# Patient Record
Sex: FEMALE | Race: WHITE | NOT HISPANIC OR LATINO | URBAN - METROPOLITAN AREA
[De-identification: names, ages, dates, MRNs, and addresses within clinical notes are randomized per-mention and may not be internally consistent; named-entity substitution may affect disease eponyms.]

---

## 2017-01-18 ENCOUNTER — OFFICE (OUTPATIENT)
Dept: URBAN - METROPOLITAN AREA CLINIC 11 | Facility: CLINIC | Age: 62
End: 2017-01-18
Payer: MEDICARE

## 2017-01-18 VITALS
SYSTOLIC BLOOD PRESSURE: 136 MMHG | HEART RATE: 65 BPM | HEIGHT: 62 IN | WEIGHT: 165 LBS | DIASTOLIC BLOOD PRESSURE: 78 MMHG | TEMPERATURE: 97 F

## 2017-01-18 DIAGNOSIS — K64.8 OTHER HEMORRHOIDS: ICD-10-CM

## 2017-01-18 DIAGNOSIS — E66.9 OBESITY, UNSPECIFIED: ICD-10-CM

## 2017-01-18 DIAGNOSIS — R11.10 VOMITING, UNSPECIFIED: ICD-10-CM

## 2017-01-18 DIAGNOSIS — R10.30 LOWER ABDOMINAL PAIN, UNSPECIFIED: ICD-10-CM

## 2017-01-18 DIAGNOSIS — K52.9 NONINFECTIVE GASTROENTERITIS AND COLITIS, UNSPECIFIED: ICD-10-CM

## 2017-01-18 DIAGNOSIS — R11.0 NAUSEA: ICD-10-CM

## 2017-01-18 DIAGNOSIS — R19.4 CHANGE IN BOWEL HABIT: ICD-10-CM

## 2017-01-18 DIAGNOSIS — K52.89 OTHER SPECIFIED NONINFECTIVE GASTROENTERITIS AND COLITIS: ICD-10-CM

## 2017-01-18 PROCEDURE — 99213 OFFICE O/P EST LOW 20 MIN: CPT | Performed by: INTERNAL MEDICINE

## 2017-01-18 PROCEDURE — G8427 DOCREV CUR MEDS BY ELIG CLIN: HCPCS | Performed by: INTERNAL MEDICINE

## 2017-01-18 NOTE — SERVICEHPINOTES
Ms. Quiñones is a 61-year-old female with a long history of chronic abdominal complaints who is now here with new onset acute GI complaints different than her normal symptoms. Please see prior notes for details of her past history. She has had an extensive negative workup. The patient states that she was in her normal state of health until last night when she started having acute onset lower abdominal cramping pain associated with diarrhea, nausea, and some vomiting. She states that she is not really having much diarrhea now, but is having red tinged mucus per rectum. This has occurred about seven times since last night. She feels fatigued and tired. She has had some chills but has not had any fever. She states that the symptoms are different and worse than her normal IBS type symptoms. She denies any recent travel. She denies any recent antibiotic use. She did spleen yesterday at the [employer] using a computer and also took care of her granddaughter the day before. She denies any sick contacts that she is aware of. She ate raw vegetables and cooked vegetables last night for dinner. Of note, the patient is on Bentyl, Lomotil, and colestipol for her chronic IBS with diarrhea.

## 2017-01-18 NOTE — SERVICENOTES
The patient has a history of chronic IBS with diarrhea but at this time as new onset of acute symptoms that appear different than her normal symptoms are more consistent with a gastroenteritis, favoring colitis given the tenesmus and red tinged mucus.  She is afebrile.  Vital signs are stable.  I will check some basic blood work.  Unless she has significant elevation of her white blood cell count we will hold off on antibiotics and treat conservatively now with fluids, bland diet, and Pepto-Bismol.  We will check stool studies, including stool for enteric hemorrhagic Escherichia coli and C. difficile.  If stool studies are negative she can restart taking Lomotil.  I also instructed her to restart taking her Bentyl which may help her abdominal cramping.  If her symptoms worsen or persist then we may consider empiric trial of an antibiotic.  She should also notify us if she has any larger volume bleeding.  We will hold off on any endoscopic evaluation at this time given the acute onset of symptoms and no significant bleeding.  If diarrhea is persistent for the next couple of weeks we may consider endoscopic evaluation.

## 2017-01-22 LAB
BASIC METABOLIC PANEL: CALCIUM TOTAL: 9.7 MG/DL (ref 8.5–10.5)
BASIC METABOLIC PANEL: CARBON DIOXIDE: 29 MEQ/L (ref 21–31)
BASIC METABOLIC PANEL: CHLORIDE: 100 MEQ/L (ref 96–106)
BASIC METABOLIC PANEL: CREATININE: 0.64 MG/DL — LOW (ref 0.8–1.4)
BASIC METABOLIC PANEL: GLUCOSE: 105 MG/DL — HIGH (ref 65–100)
BASIC METABOLIC PANEL: POTASSIUM: 4.2 MEQ/ML (ref 3.5–5.4)
BASIC METABOLIC PANEL: SODIUM: 142 MEQ/L (ref 135–145)
BASIC METABOLIC PANEL: UREA NITROGEN: 16 MG/DL (ref 6–20)
CBC COMPLETE BLOOD COUNT W/O DIFF: HEMATOCRIT: 39.6 % (ref 39–55)
CBC COMPLETE BLOOD COUNT W/O DIFF: HEMOGLOBIN: 13.2 G/DL (ref 13–17.5)
CBC COMPLETE BLOOD COUNT W/O DIFF: MCH: 23.7 PG — LOW (ref 25–35)
CBC COMPLETE BLOOD COUNT W/O DIFF: MCHC: 33.3 % (ref 30–38)
CBC COMPLETE BLOOD COUNT W/O DIFF: MCV: 71.1 FL — LOW (ref 78–102)
CBC COMPLETE BLOOD COUNT W/O DIFF: PLATELET COUNT: 300 K/UL (ref 150–450)
CBC COMPLETE BLOOD COUNT W/O DIFF: RBC DISTRIBUTION WIDTH: 16.3 % — HIGH (ref 11.5–16)
CBC COMPLETE BLOOD COUNT W/O DIFF: RED BLOOD CELL COUNT: 5.57 M/UL (ref 4.3–5.7)
CBC COMPLETE BLOOD COUNT W/O DIFF: WHITE BLOOD CELL COUNT: 8 K/UL (ref 4–11)
CLOSTRIDIUM DIFFICILE TOXIN: CD TOXIN: (no result)
STOOL CULTURE: C STOOL: (no result)

## 2017-09-08 ENCOUNTER — OFFICE (OUTPATIENT)
Dept: URBAN - METROPOLITAN AREA CLINIC 11 | Facility: CLINIC | Age: 62
End: 2017-09-08

## 2017-09-08 PROCEDURE — G9199 DOC REASON FOR NO VTE: HCPCS | Performed by: INTERNAL MEDICINE

## 2017-11-17 ENCOUNTER — OFFICE (OUTPATIENT)
Dept: URBAN - METROPOLITAN AREA CLINIC 11 | Facility: CLINIC | Age: 62
End: 2017-11-17
Payer: MEDICARE

## 2017-11-17 VITALS
HEART RATE: 64 BPM | WEIGHT: 154 LBS | SYSTOLIC BLOOD PRESSURE: 138 MMHG | DIASTOLIC BLOOD PRESSURE: 76 MMHG | HEIGHT: 62 IN

## 2017-11-17 DIAGNOSIS — R14.0 ABDOMINAL DISTENSION (GASEOUS): ICD-10-CM

## 2017-11-17 DIAGNOSIS — K21.9 GASTRO-ESOPHAGEAL REFLUX DISEASE WITHOUT ESOPHAGITIS: ICD-10-CM

## 2017-11-17 DIAGNOSIS — R10.84 GENERALIZED ABDOMINAL PAIN: ICD-10-CM

## 2017-11-17 DIAGNOSIS — R19.7 DIARRHEA, UNSPECIFIED: ICD-10-CM

## 2017-11-17 DIAGNOSIS — K58.9 IRRITABLE BOWEL SYNDROME WITHOUT DIARRHEA: ICD-10-CM

## 2017-11-17 DIAGNOSIS — D12.6 BENIGN NEOPLASM OF COLON, UNSPECIFIED: ICD-10-CM

## 2017-11-17 LAB
C-REACTIVE PROTEIN, QUANT: 11 MG/L — HIGH (ref 0–4.9)
C1 ESTERASE INHIBITOR, FUNC: C1 EST.INHIB.FUNCT.: 84 %MEAN NORMAL
CBC, PLATELET, NO DIFFERENTIAL: HEMATOCRIT: 42.5 % (ref 34–46.6)
CBC, PLATELET, NO DIFFERENTIAL: HEMOGLOBIN: 13.4 G/DL (ref 11.1–15.9)
CBC, PLATELET, NO DIFFERENTIAL: MCH: 23.1 PG — LOW (ref 26.6–33)
CBC, PLATELET, NO DIFFERENTIAL: MCHC: 31.5 G/DL (ref 31.5–35.7)
CBC, PLATELET, NO DIFFERENTIAL: MCV: 73 FL — LOW (ref 79–97)
CBC, PLATELET, NO DIFFERENTIAL: PLATELETS: 295 X10E3/UL (ref 150–379)
CBC, PLATELET, NO DIFFERENTIAL: RBC: 5.8 X10E6/UL — HIGH (ref 3.77–5.28)
CBC, PLATELET, NO DIFFERENTIAL: RDW: 16.7 % — HIGH (ref 12.3–15.4)
CBC, PLATELET, NO DIFFERENTIAL: WBC: 5.8 X10E3/UL (ref 3.4–10.8)
COMPLEMENT C4, SERUM: 39 MG/DL (ref 14–44)
FE+TIBC+FER: FERRITIN, SERUM: 262 NG/ML — HIGH (ref 15–150)
FE+TIBC+FER: IRON BIND.CAP.(TIBC): 294 UG/DL (ref 250–450)
FE+TIBC+FER: IRON SATURATION: 32 % (ref 15–55)
FE+TIBC+FER: IRON, SERUM: 94 UG/DL (ref 27–159)
FE+TIBC+FER: UIBC: 200 UG/DL (ref 131–425)
SEDIMENTATION RATE-WESTERGREN: 14 MM/HR (ref 0–40)
VITAMIN B12 AND FOLATE: FOLATE (FOLIC ACID), SERUM: >20 NG/ML
VITAMIN B12 AND FOLATE: VITAMIN B12: 1014 PG/ML — HIGH (ref 211–946)

## 2017-11-17 PROCEDURE — 99213 OFFICE O/P EST LOW 20 MIN: CPT | Performed by: INTERNAL MEDICINE

## 2017-11-17 PROCEDURE — G8427 DOCREV CUR MEDS BY ELIG CLIN: HCPCS | Performed by: INTERNAL MEDICINE

## 2017-11-17 RX ORDER — SODIUM PICOSULFATE, MAGNESIUM OXIDE, AND ANHYDROUS CITRIC ACID 10; 3.5; 12 MG/16.1G; G/16.1G; G/16.1G
POWDER, METERED ORAL
Qty: 1 | Refills: 0 | Status: COMPLETED
Start: 2017-11-17 | End: 2017-12-08

## 2017-11-17 RX ORDER — DICYCLOMINE HYDROCHLORIDE 10 MG/1
CAPSULE ORAL
Qty: 120 | Refills: 6 | Status: COMPLETED
Start: 2016-11-14 | End: 2018-10-16

## 2017-11-17 RX ORDER — DIPHENOXYLATE HYDROCHLORIDE AND ATROPINE SULFATE 2.5; .025 MG/1; MG/1
7.5 TABLET ORAL
Qty: 90 | Refills: 5 | Status: ACTIVE

## 2017-11-17 RX ORDER — NORTRIPTYLINE HYDROCHLORIDE 25 MG/1
CAPSULE ORAL
Qty: 60 | Refills: 6 | Status: COMPLETED
Start: 2017-11-17 | End: 2018-03-16

## 2017-11-17 NOTE — SERVICENOTES
Patient continues to have symptoms of diarrhea and abdominal pain.  Overall it does appear to be slightly better, but has been persistent over the past 2 and half years.  I will go ahead and recheck some basic labs as above and give her a trial of a TCA to see if this might help.  In the meantime I did recommend she restart the Lomotil and dicyclomine.  I am a little concerned that she has lost 10 lb over the past year but overall she appears to be doing well. I will also check a few other labs to ensure no other type neuroendocrine issue going on as well. Given that her initial colonoscopy actually occurred prior to her diarrhea symptoms started we discussed the possibility of proceeding with a repeat colonoscopy today in order to allow for repeat evaluation of the terminal ileum and random colon biopsies and the patient states that she would be interested in this. We can also consider capsule endoscopy down the road, but the patient would like to hold off on this for now.

## 2017-11-17 NOTE — SERVICEHPINOTES
Ms. Quiñones is a 62-year-old female here for follow-up of abdominal pain, diarrhea, and heartburn. I initially saw her in March 2015 were at that time she was having more epigastric abdominal pain and burning. This is mainly after meals. It was associated with some nausea and bloating. At that time she was not having any diarrhea. Blood work at that time did include a mild microcytic anemia with mild decrease of hemoglobin to 12.5 but normal hematocrit of 40.2. C-reactive protein was minimally elevated at 0.7. She underwent EGD and colonoscopy with EGD revealing normal esophagus, gastric, and duodenal mucosa was negative biopsies from the stomach and duodenum. Colonoscopy at the same time revealed normal mucosa to the level of the terminal ileum. Two small polyps removed, one of which was a tubular adenoma. Her next colonoscopy is due in five years. Iron studies performed were negative including normal ferritin and percent iron saturation. Because of some persistent symptoms she underwent CT of the abdomen and pelvis that was completely normal. Because of persistent symptoms we gave her a trial of rifaximin, which she states did not help much at all. She was also on probiotics for two months which also did not help much. We switched her from Levsin to Bentyl which she states helps more, but she still has symptoms. Because of some persistent diarrhea we gave her a trial of colestipol which she states did not help that much. Because of this she stopped it. She now states that she is having a bowel movement every other day and will have diarrhea episodes twice a week. Her symptoms are better when she takes Lomotil and an antispasmodic. She denies any fevers or chills. She has noted some difficulty with swallowing recently and was recently diagnosed with a goiter. She also has lost around 10 lb since January 2017. BR

## 2017-12-08 ENCOUNTER — AMBULATORY SURGICAL CENTER (OUTPATIENT)
Dept: URBAN - METROPOLITAN AREA SURGERY 3 | Facility: SURGERY | Age: 62
End: 2017-12-08

## 2017-12-08 ENCOUNTER — OFFICE (OUTPATIENT)
Dept: URBAN - METROPOLITAN AREA PATHOLOGY 22 | Facility: PATHOLOGY | Age: 62
End: 2017-12-08

## 2017-12-08 ENCOUNTER — AMBULATORY SURGICAL CENTER (OUTPATIENT)
Dept: URBAN - METROPOLITAN AREA SURGERY 3 | Facility: SURGERY | Age: 62
End: 2017-12-08
Payer: COMMERCIAL

## 2017-12-08 VITALS
HEIGHT: 62 IN | HEART RATE: 70 BPM | DIASTOLIC BLOOD PRESSURE: 73 MMHG | DIASTOLIC BLOOD PRESSURE: 50 MMHG | HEIGHT: 62 IN | SYSTOLIC BLOOD PRESSURE: 146 MMHG | HEART RATE: 72 BPM | SYSTOLIC BLOOD PRESSURE: 123 MMHG | SYSTOLIC BLOOD PRESSURE: 133 MMHG | HEART RATE: 66 BPM | RESPIRATION RATE: 18 BRPM | DIASTOLIC BLOOD PRESSURE: 57 MMHG | SYSTOLIC BLOOD PRESSURE: 132 MMHG | RESPIRATION RATE: 20 BRPM | HEART RATE: 76 BPM | DIASTOLIC BLOOD PRESSURE: 80 MMHG | RESPIRATION RATE: 16 BRPM | RESPIRATION RATE: 20 BRPM | HEART RATE: 70 BPM | OXYGEN SATURATION: 98 % | SYSTOLIC BLOOD PRESSURE: 123 MMHG | TEMPERATURE: 97.1 F | SYSTOLIC BLOOD PRESSURE: 136 MMHG | OXYGEN SATURATION: 95 % | WEIGHT: 154 LBS | DIASTOLIC BLOOD PRESSURE: 90 MMHG | DIASTOLIC BLOOD PRESSURE: 73 MMHG | OXYGEN SATURATION: 98 % | OXYGEN SATURATION: 99 % | SYSTOLIC BLOOD PRESSURE: 133 MMHG | RESPIRATION RATE: 16 BRPM | HEART RATE: 66 BPM | OXYGEN SATURATION: 96 % | SYSTOLIC BLOOD PRESSURE: 132 MMHG | WEIGHT: 154 LBS | DIASTOLIC BLOOD PRESSURE: 100 MMHG | DIASTOLIC BLOOD PRESSURE: 100 MMHG | DIASTOLIC BLOOD PRESSURE: 57 MMHG | OXYGEN SATURATION: 99 % | SYSTOLIC BLOOD PRESSURE: 146 MMHG | SYSTOLIC BLOOD PRESSURE: 122 MMHG | OXYGEN SATURATION: 96 % | SYSTOLIC BLOOD PRESSURE: 122 MMHG | OXYGEN SATURATION: 95 % | DIASTOLIC BLOOD PRESSURE: 90 MMHG | RESPIRATION RATE: 17 BRPM | TEMPERATURE: 97.1 F | SYSTOLIC BLOOD PRESSURE: 136 MMHG | DIASTOLIC BLOOD PRESSURE: 50 MMHG | OXYGEN SATURATION: 100 % | TEMPERATURE: 97.8 F | DIASTOLIC BLOOD PRESSURE: 80 MMHG | OXYGEN SATURATION: 100 % | HEART RATE: 72 BPM | TEMPERATURE: 97.8 F | RESPIRATION RATE: 18 BRPM | HEART RATE: 76 BPM | RESPIRATION RATE: 17 BRPM

## 2017-12-08 DIAGNOSIS — R19.7 DIARRHEA, UNSPECIFIED: ICD-10-CM

## 2017-12-08 DIAGNOSIS — K64.0 FIRST DEGREE HEMORRHOIDS: ICD-10-CM

## 2017-12-08 PROBLEM — K52.89 CLINICALLY SIGNIFICANT DIARRHEA OF UNEXPLAINED ORIGIN: Status: ACTIVE | Noted: 2017-12-08

## 2017-12-08 PROCEDURE — G8907 PT DOC NO EVENTS ON DISCHARG: HCPCS | Performed by: INTERNAL MEDICINE

## 2017-12-08 PROCEDURE — G8918 PT W/O PREOP ORDER IV AB PRO: HCPCS | Performed by: INTERNAL MEDICINE

## 2017-12-08 PROCEDURE — 88342 IMHCHEM/IMCYTCHM 1ST ANTB: CPT | Performed by: INTERNAL MEDICINE

## 2017-12-08 PROCEDURE — 45380 COLONOSCOPY AND BIOPSY: CPT | Performed by: INTERNAL MEDICINE

## 2017-12-08 PROCEDURE — 88305 TISSUE EXAM BY PATHOLOGIST: CPT | Performed by: INTERNAL MEDICINE

## 2018-03-16 ENCOUNTER — OFFICE (OUTPATIENT)
Dept: URBAN - METROPOLITAN AREA CLINIC 11 | Facility: CLINIC | Age: 63
End: 2018-03-16

## 2018-03-16 VITALS
DIASTOLIC BLOOD PRESSURE: 76 MMHG | HEIGHT: 62 IN | SYSTOLIC BLOOD PRESSURE: 129 MMHG | HEART RATE: 64 BPM | WEIGHT: 166 LBS

## 2018-03-16 DIAGNOSIS — K21.9 GASTRO-ESOPHAGEAL REFLUX DISEASE WITHOUT ESOPHAGITIS: ICD-10-CM

## 2018-03-16 DIAGNOSIS — R19.7 DIARRHEA, UNSPECIFIED: ICD-10-CM

## 2018-03-16 DIAGNOSIS — R19.4 CHANGE IN BOWEL HABIT: ICD-10-CM

## 2018-03-16 DIAGNOSIS — R14.0 ABDOMINAL DISTENSION (GASEOUS): ICD-10-CM

## 2018-03-16 DIAGNOSIS — R13.10 DYSPHAGIA, UNSPECIFIED: ICD-10-CM

## 2018-03-16 DIAGNOSIS — R10.84 GENERALIZED ABDOMINAL PAIN: ICD-10-CM

## 2018-03-16 DIAGNOSIS — K58.0 IRRITABLE BOWEL SYNDROME WITH DIARRHEA: ICD-10-CM

## 2018-03-16 DIAGNOSIS — Z86.010 PERSONAL HISTORY OF COLONIC POLYPS: ICD-10-CM

## 2018-03-16 DIAGNOSIS — E66.9 OBESITY, UNSPECIFIED: ICD-10-CM

## 2018-03-16 DIAGNOSIS — E04.9 NONTOXIC GOITER, UNSPECIFIED: ICD-10-CM

## 2018-03-16 PROCEDURE — 99213 OFFICE O/P EST LOW 20 MIN: CPT | Performed by: INTERNAL MEDICINE

## 2018-03-16 RX ORDER — CETIRIZINE HYDROCHLORIDE 10 MG/1
TABLET ORAL
Qty: 30 | Refills: 5 | Status: COMPLETED
Start: 2018-03-16 | End: 2018-10-16

## 2018-03-16 RX ORDER — DICYCLOMINE HYDROCHLORIDE 10 MG/1
CAPSULE ORAL
Qty: 120 | Refills: 6 | Status: COMPLETED
Start: 2016-11-14 | End: 2018-10-16

## 2018-03-16 RX ORDER — RANITIDINE 150 MG/1
300 TABLET ORAL
Qty: 60 | Refills: 11 | Status: COMPLETED
Start: 2018-03-16 | End: 2018-10-16

## 2018-03-16 RX ORDER — DIPHENOXYLATE HYDROCHLORIDE AND ATROPINE SULFATE 2.5; .025 MG/1; MG/1
7.5 TABLET ORAL
Qty: 90 | Refills: 5 | Status: ACTIVE

## 2018-03-16 NOTE — SERVICEHPINOTES
Ms. Quiñones is a 62-year-old female here for follow-up of abdominal pain, diarrhea, and heartburn. I initially saw her in March 2015 were at that time she was having more epigastric abdominal pain and burning. This is mainly after meals. It was associated with some nausea and bloating. At that time she was not having any diarrhea. Blood work at that time did include a mild microcytic anemia with mild decrease of hemoglobin to 12.5 but normal hematocrit of 40.2. C-reactive protein was minimally elevated at 0.7. She underwent EGD and colonoscopy with EGD revealing normal esophagus, gastric, and duodenal mucosa was negative biopsies from the stomach and duodenum. Colonoscopy at the same time revealed normal mucosa to the level of the terminal ileum. Two small polyps removed, one of which was a tubular adenoma. Her next colonoscopy is due in five years. Iron studies performed were negative including normal ferritin and percent iron saturation. Because of some persistent symptoms she underwent CT of the abdomen and pelvis that was completely normal. Because of persistent symptoms we gave her a trial of rifaximin, which she states did not help much at all. She was also on probiotics for two months which also did not help much. We switched her from Levsin to Bentyl which she states helps more, but she still has symptoms. Because of some persistent diarrhea we gave her a trial of colestipol which she states did not help that much. Because of this she stopped it. When I last saw the patient November 2017 she was having some more diarrhea and had some weight loss. We check blood work which was normal including normal complement levels, normal hematocrit, and normal inflammatory markers except for mild elevation of CRP at 11. We did have her undergo colonoscopy which was normal to the terminal ileum. Biopsies were normal except for some variable increase in mast cells. Because of this we did give her a trial of H1 and H2 blocker which she states actuallyhelps some but she stopped taking because she did not want to take the medicine on a regular basis. She states overall she is doing better. She still have a flare of diarrhea occurring a couple of times every month. She states this usually occurs whenever she goes out to eat and has some dietary indiscretion. She does take Lomotil as needed. She also takes Bentyl as needed. She states that she has had some more issues with swallowing stating that she feels food getting stuck on the way down. She has had occasionally regurgitate food. She thinks that it is mainly due to solids and meats but does not any problems with liquids. She states that she has a goiter that is probably going to have surgery sometime in the next few months and thinks that it may be related to this. She has gained about 10 lb since our last visit.

## 2018-03-16 NOTE — SERVICENOTES
Overall the patient actually does appear to be doing better.  Her weight has improved.  Her symptoms do appear most consistent with IBS with diarrhea.  Because she is doing better we will hold off on capsule endoscopy, which is fine with the patient. I did recommend she start taking Zantac and cetirizine on a more regular basis, however she states that she will only take it more as needed because she does not want to take something on a regular basis.  I did recommend that she start taking Lomotil and Bentyl prior to eating out to see this may help with her urgency and diarrhea.  She was told to notify us if symptoms do worsen in the meantime.  Because of her dysphagia symptoms I do think she should undergo EGD.  I did offer esophagram the patient would like to undergo EGD and is okay with empiric dilation.  While this could be due to her goiter, I would like to rule out another esophageal source such as eosinophilic esophagitis or stricture.

## 2018-04-23 ENCOUNTER — AMBULATORY SURGICAL CENTER (OUTPATIENT)
Dept: URBAN - METROPOLITAN AREA SURGERY 3 | Facility: SURGERY | Age: 63
End: 2018-04-23
Payer: COMMERCIAL

## 2018-04-23 ENCOUNTER — AMBULATORY SURGICAL CENTER (OUTPATIENT)
Dept: URBAN - METROPOLITAN AREA SURGERY 3 | Facility: SURGERY | Age: 63
End: 2018-04-23

## 2018-04-23 ENCOUNTER — OFFICE (OUTPATIENT)
Dept: URBAN - METROPOLITAN AREA PATHOLOGY 22 | Facility: PATHOLOGY | Age: 63
End: 2018-04-23

## 2018-04-23 VITALS
TEMPERATURE: 97.6 F | HEART RATE: 70 BPM | RESPIRATION RATE: 18 BRPM | TEMPERATURE: 98.1 F | OXYGEN SATURATION: 95 % | DIASTOLIC BLOOD PRESSURE: 73 MMHG | HEART RATE: 70 BPM | SYSTOLIC BLOOD PRESSURE: 135 MMHG | DIASTOLIC BLOOD PRESSURE: 69 MMHG | SYSTOLIC BLOOD PRESSURE: 127 MMHG | WEIGHT: 160 LBS | DIASTOLIC BLOOD PRESSURE: 73 MMHG | RESPIRATION RATE: 19 BRPM | SYSTOLIC BLOOD PRESSURE: 129 MMHG | WEIGHT: 160 LBS | DIASTOLIC BLOOD PRESSURE: 80 MMHG | RESPIRATION RATE: 18 BRPM | DIASTOLIC BLOOD PRESSURE: 70 MMHG | RESPIRATION RATE: 16 BRPM | DIASTOLIC BLOOD PRESSURE: 75 MMHG | HEART RATE: 74 BPM | SYSTOLIC BLOOD PRESSURE: 136 MMHG | DIASTOLIC BLOOD PRESSURE: 76 MMHG | SYSTOLIC BLOOD PRESSURE: 127 MMHG | RESPIRATION RATE: 19 BRPM | DIASTOLIC BLOOD PRESSURE: 76 MMHG | SYSTOLIC BLOOD PRESSURE: 126 MMHG | RESPIRATION RATE: 16 BRPM | HEART RATE: 73 BPM | TEMPERATURE: 97.8 F | HEART RATE: 89 BPM | DIASTOLIC BLOOD PRESSURE: 69 MMHG | TEMPERATURE: 97.8 F | DIASTOLIC BLOOD PRESSURE: 80 MMHG | OXYGEN SATURATION: 95 % | DIASTOLIC BLOOD PRESSURE: 70 MMHG | OXYGEN SATURATION: 94 % | HEART RATE: 73 BPM | HEART RATE: 89 BPM | OXYGEN SATURATION: 94 % | OXYGEN SATURATION: 96 % | TEMPERATURE: 98.1 F | TEMPERATURE: 97.6 F | SYSTOLIC BLOOD PRESSURE: 126 MMHG | HEIGHT: 62 IN | SYSTOLIC BLOOD PRESSURE: 135 MMHG | HEART RATE: 78 BPM | SYSTOLIC BLOOD PRESSURE: 136 MMHG | OXYGEN SATURATION: 96 % | SYSTOLIC BLOOD PRESSURE: 129 MMHG | HEART RATE: 78 BPM | HEIGHT: 62 IN | HEART RATE: 74 BPM | DIASTOLIC BLOOD PRESSURE: 75 MMHG

## 2018-04-23 DIAGNOSIS — K21.9 GASTRO-ESOPHAGEAL REFLUX DISEASE WITHOUT ESOPHAGITIS: ICD-10-CM

## 2018-04-23 DIAGNOSIS — K29.50 UNSPECIFIED CHRONIC GASTRITIS WITHOUT BLEEDING: ICD-10-CM

## 2018-04-23 DIAGNOSIS — R13.10 DYSPHAGIA, UNSPECIFIED: ICD-10-CM

## 2018-04-23 DIAGNOSIS — R14.0 ABDOMINAL DISTENSION (GASEOUS): ICD-10-CM

## 2018-04-23 PROCEDURE — 88313 SPECIAL STAINS GROUP 2: CPT | Performed by: INTERNAL MEDICINE

## 2018-04-23 PROCEDURE — 88342 IMHCHEM/IMCYTCHM 1ST ANTB: CPT | Performed by: INTERNAL MEDICINE

## 2018-04-23 PROCEDURE — 88305 TISSUE EXAM BY PATHOLOGIST: CPT | Performed by: INTERNAL MEDICINE

## 2018-04-23 PROCEDURE — G8918 PT W/O PREOP ORDER IV AB PRO: HCPCS | Performed by: INTERNAL MEDICINE

## 2018-04-23 PROCEDURE — 43239 EGD BIOPSY SINGLE/MULTIPLE: CPT | Mod: 59 | Performed by: INTERNAL MEDICINE

## 2018-04-23 PROCEDURE — 43248 EGD GUIDE WIRE INSERTION: CPT | Performed by: INTERNAL MEDICINE

## 2018-04-23 PROCEDURE — G8907 PT DOC NO EVENTS ON DISCHARG: HCPCS | Performed by: INTERNAL MEDICINE

## 2018-10-16 ENCOUNTER — OFFICE (OUTPATIENT)
Dept: URBAN - METROPOLITAN AREA CLINIC 12 | Facility: CLINIC | Age: 63
End: 2018-10-16

## 2018-10-16 VITALS
SYSTOLIC BLOOD PRESSURE: 140 MMHG | HEIGHT: 62 IN | DIASTOLIC BLOOD PRESSURE: 79 MMHG | HEART RATE: 69 BPM | WEIGHT: 169 LBS

## 2018-10-16 DIAGNOSIS — K21.9 GASTRO-ESOPHAGEAL REFLUX DISEASE WITHOUT ESOPHAGITIS: ICD-10-CM

## 2018-10-16 DIAGNOSIS — E66.9 OBESITY, UNSPECIFIED: ICD-10-CM

## 2018-10-16 DIAGNOSIS — R14.0 ABDOMINAL DISTENSION (GASEOUS): ICD-10-CM

## 2018-10-16 DIAGNOSIS — Z86.010 PERSONAL HISTORY OF COLONIC POLYPS: ICD-10-CM

## 2018-10-16 DIAGNOSIS — E04.9 NONTOXIC GOITER, UNSPECIFIED: ICD-10-CM

## 2018-10-16 DIAGNOSIS — R13.10 DYSPHAGIA, UNSPECIFIED: ICD-10-CM

## 2018-10-16 DIAGNOSIS — K58.0 IRRITABLE BOWEL SYNDROME WITH DIARRHEA: ICD-10-CM

## 2018-10-16 PROCEDURE — 99213 OFFICE O/P EST LOW 20 MIN: CPT | Performed by: INTERNAL MEDICINE

## 2018-10-16 RX ORDER — DICYCLOMINE HYDROCHLORIDE 20 MG/1
TABLET ORAL
Qty: 90 | Refills: 3 | Status: ACTIVE
Start: 2018-10-16

## 2018-10-16 RX ORDER — GLYCOPYRROLATE 1 MG/1
2 TABLET ORAL
Qty: 60 | Refills: 3 | Status: COMPLETED
Start: 2018-10-04 | End: 2019-12-11

## 2018-10-16 RX ORDER — DIPHENOXYLATE HYDROCHLORIDE AND ATROPINE SULFATE 2.5; .025 MG/1; MG/1
7.5 TABLET ORAL
Qty: 90 | Refills: 5 | Status: ACTIVE

## 2018-10-16 NOTE — SERVICENOTES
The patient's swallowing does appear to be doing better after goiter removal.  If she continues to have swallowing problems despite further healing from her surgery then we can consider esophagram.  I will try to get dicyclomine from a different pharmacy for her as this does work for her.  We will also renew her Lomotil.  If the dicyclomine is not available then we will try again to get her Robinul.  She should otherwise notify us if she has any worsening of symptoms in the meantime.

## 2018-10-16 NOTE — SERVICEHPINOTES
Ms. Quiñones is a 62-year-old female here for follow-up of IBS and dysphagia. I initially saw her in March 2015 were at that time she was having more epigastric abdominal pain and burning. This is mainly after meals. It was associated with some nausea and bloating. At that time she was not having any diarrhea. Blood work at that time did include a mild microcytic anemia with mild decrease of hemoglobin to 12.5 but normal hematocrit of 40.2. C-reactive protein was minimally elevated at 0.7. She underwent EGD and colonoscopy with EGD revealing normal esophagus, gastric, and duodenal mucosa was negative biopsies from the stomach and duodenum. Colonoscopy at the same time revealed normal mucosa to the level of the terminal ileum. Two small polyps removed, one of which was a tubular adenoma. Her next colonoscopy is due in five years. Iron studies performed were negative including normal ferritin and percent iron saturation. Because of some persistent symptoms she underwent CT of the abdomen and pelvis that was completely normal. Because of persistent symptoms we gave her a trial of rifaximin, which she states did not help much at all. She was also on probiotics for two months which also did not help much. We switched her from Levsin to Bentyl which she states helps more, but she still has symptoms. Because of some persistent diarrhea we gave her a trial of colestipol which she states did not help that much. Because of this she stopped it. When I saw the patient November 2017 she was having some more diarrhea and had some weight loss. We check blood work which was normal including normal complement levels, normal hematocrit, and normal inflammatory markers except for mild elevation of CRP at 11. We did have her undergo colonoscopy which was normal to the terminal ileum. Biopsies were normal except for some variable increase in mast cells. Because of this we did give her a trial of H1 and H2 blocker which she states actually helps some but she stopped taking because she did not want to take the medicine on a regular basis. Because of the patient's weight loss and dysphagia symptoms she underwent EGD and colonoscopy earlier this year. Colonoscopy because of some worse issues with dysphagia she did undergo EGD which was normal. Empiric dilation was performed. Esophageal biopsies, stomach biopsies, and duodenal biopsies were all negative. There is some mild patchy intestinal steatosis which appeared insignificant. She did have some mild pain after the EGD but this resolved over the next couple of days. She has since undergone surgery for goiter which she states she was told was wrapped around her esophagus. This occurred three weeks ago and she states that she still does feel a globus sensation but thinks it is related to inflammation and swelling from around the surgery. She states overall she is doing better. She has gained a few lb since her last visit. She states that as long as she takes her Lomotil and Bentyl her symptoms are under good control. She also has to watch her diet. She was told by her pharmacy that Bentyl was on back order so we sent in a prescription for Robinul, however it appears that she was unable to pick this up as the pharmacy apparently tried to give her a different medication that she refused.

## 2019-12-11 ENCOUNTER — OFFICE (OUTPATIENT)
Dept: URBAN - METROPOLITAN AREA CLINIC 19 | Facility: CLINIC | Age: 64
End: 2019-12-11

## 2019-12-11 VITALS
WEIGHT: 157 LBS | HEIGHT: 62 IN | DIASTOLIC BLOOD PRESSURE: 67 MMHG | SYSTOLIC BLOOD PRESSURE: 129 MMHG | HEART RATE: 76 BPM

## 2019-12-11 DIAGNOSIS — E04.9 NONTOXIC GOITER, UNSPECIFIED: ICD-10-CM

## 2019-12-11 DIAGNOSIS — K58.9 IRRITABLE BOWEL SYNDROME WITHOUT DIARRHEA: ICD-10-CM

## 2019-12-11 DIAGNOSIS — K21.9 GASTRO-ESOPHAGEAL REFLUX DISEASE WITHOUT ESOPHAGITIS: ICD-10-CM

## 2019-12-11 DIAGNOSIS — R13.10 DYSPHAGIA, UNSPECIFIED: ICD-10-CM

## 2019-12-11 PROCEDURE — 99213 OFFICE O/P EST LOW 20 MIN: CPT | Performed by: INTERNAL MEDICINE

## 2019-12-11 RX ORDER — DICYCLOMINE HYDROCHLORIDE 20 MG/1
TABLET ORAL
Qty: 120 | Refills: 11 | Status: COMPLETED
Start: 2019-12-11 | End: 2023-06-29

## 2019-12-11 RX ORDER — DIPHENOXYLATE HYDROCHLORIDE AND ATROPINE SULFATE 2.5; .025 MG/1; MG/1
7.5 TABLET ORAL
Qty: 90 | Refills: 5 | Status: ACTIVE

## 2019-12-11 NOTE — SERVICENOTES
Dr. Rich has interviewed and examined the patient and agrees with the plan of care.
The patient has had a significant workup as above which has been negative.  Her symptoms overall much improved from prior and she now only has a loose bowel movement occasionally depending on what she eats.  Unfortunate, she still does have dietary discretion.  I did recommend she consider taking the dicyclomine on a more regular basis, especially if she is going to be something that is questionable.  She does get a trial of Xifaxan 2015 which she states did not help that much.  She has not been taking a probiotic so I did recommend she start taking this again.  If her symptoms do not worsen and she starts having more frequent episodes of diarrhea then I do recommend checking stool for fecal elastase.  Her dysphagia has resolved since her goiter was removed.

## 2019-12-11 NOTE — SERVICEHPINOTES
Ms. Quiñones is a 64-year-old female here for follow-up of IBS and dysphagia.  initially saw her in March 2015 where at that time she was having more epigastric abdominal pain and burning. This is mainly after meals. It was associated with some nausea and bloating. At that time she was not having any diarrhea. Blood work at that time did include a mild microcytic anemia with mild decrease of hemoglobin to 12.5 but normal hematocrit of 40.2. C-reactive protein was minimally elevated at 0.7. She underwent EGD and colonoscopy with EGD revealing normal esophagus, gastric, and duodenal mucosa was negative biopsies from the stomach and duodenum. Colonoscopy at the same time revealed normal mucosa to the level of the terminal ileum. Two small polyps removed, one of which was a tubular adenoma. Her next colonoscopy is due in five years. Iron studies performed were negative including normal ferritin and percent iron saturation. Because of some persistent symptoms she underwent CT of the abdomen and pelvis that was completely normal. Because of persistent symptoms we gave her a trial of rifaximin, which she states did not help much at all. She was also on probiotics for two months which also did not help much. We switched her from Levsin to Bentyl which she states helps more, but she still has symptoms. Because of some persistent diarrhea we gave her a trial of colestipol which she states did not help that much. Because of this she stopped it. When Dr. Rich saw the patient November 2017 she was having some more diarrhea and had some weight loss. We check blood work which was normal including normal complement levels, normal hematocrit, and normal inflammatory markers except for mild elevation of CRP at 11. We did have her undergo colonoscopy which was normal to the terminal ileum. Biopsies were normal except for some variable increase in mast cells. Because of this we did give her a trial of H1 and H2 blocker which she states actually helps some but she stopped taking because she did not want to take the medicine on a regular basis. Because of the patient's weight loss and dysphagia symptoms she underwent EGD and colonoscopy earlier this year. Colonoscopy because of some worse issues with dysphagia she did undergo EGD which was normal. Empiric dilation was performed. Esophageal biopsies, stomach biopsies, and duodenal biopsies were all negative. There is some mild patchy intestinal steatosis which appeared insignificant. She did have some mild pain after the EGD but this resolved over the next couple of days. She has since undergone surgery for goiter which she states she was told was wrapped around her esophagus. This occurred three weeks ago and she states that she still does feel a globus sensation but thinks it is related to inflammation and swelling from around the surgery. She states overall she is doing better. She has gained a few lb since her last visit. She states that as long as she takes her Lomotil and Bentyl her symptoms are under good control. She also has to watch her diet. She was told by her pharmacy that Bentyl was on back order so we sent in a prescription for Robinul, however it appears that she was unable to pick this up as the pharmacy apparently tried to give her a different medication that she refused.BRInterim History: Overall, Ms. Quiñones is doing well. She states that she has no dysphagia whatsoever after her goiter was removed and she healed completely from her surgery. The patient denies reflux and states if she ever does have any indigestion she will just take a Tums or Pepto-Bismol tablet which alleviates her symptoms. The patient also reports that her IBS is under control. She states that it is mainly attributed to her diet and/or stress level. The patient states that she normally has a regular formed bowel movement every 1-2 days and that is her baseline. The patient reports when she does have a diarrhea episode it is usually around once every 6 weeks and will last 4-5 days. When she has these episodes the patient will take her Bentyl and Lomotil and eventually the symptoms will subside. The patient said this is not affecting her quality of life and it is tolerable. The patient denies melena, hematochezia, nausea, vomiting, or unintentional weight loss. The patient was recently started on phentermine for weight loss as she has gained some weight since her thyroid surgery. The patient has lost 12 lb since her last visit intentionally. The patient's next colonoscopy recall it is due in 2027.

## 2021-02-08 ENCOUNTER — OFFICE (OUTPATIENT)
Dept: URBAN - METROPOLITAN AREA CLINIC 11 | Facility: CLINIC | Age: 66
End: 2021-02-08

## 2021-02-08 VITALS
WEIGHT: 159 LBS | HEIGHT: 62 IN | OXYGEN SATURATION: 97 % | SYSTOLIC BLOOD PRESSURE: 120 MMHG | HEART RATE: 75 BPM | DIASTOLIC BLOOD PRESSURE: 66 MMHG

## 2021-02-08 DIAGNOSIS — K21.9 GASTRO-ESOPHAGEAL REFLUX DISEASE WITHOUT ESOPHAGITIS: ICD-10-CM

## 2021-02-08 DIAGNOSIS — R13.10 DYSPHAGIA, UNSPECIFIED: ICD-10-CM

## 2021-02-08 DIAGNOSIS — K58.0 IRRITABLE BOWEL SYNDROME WITH DIARRHEA: ICD-10-CM

## 2021-02-08 PROCEDURE — 99213 OFFICE O/P EST LOW 20 MIN: CPT | Performed by: INTERNAL MEDICINE

## 2021-02-08 RX ORDER — DICYCLOMINE HYDROCHLORIDE 20 MG/1
TABLET ORAL
Qty: 120 | Refills: 11 | Status: COMPLETED
Start: 2019-12-11 | End: 2023-06-29

## 2021-02-08 RX ORDER — DIPHENOXYLATE HYDROCHLORIDE AND ATROPINE SULFATE 2.5; .025 MG/1; MG/1
7.5 TABLET ORAL
Qty: 90 | Refills: 5 | Status: ACTIVE

## 2022-05-16 ENCOUNTER — OFFICE (OUTPATIENT)
Dept: URBAN - METROPOLITAN AREA CLINIC 11 | Facility: CLINIC | Age: 67
End: 2022-05-16

## 2022-05-16 VITALS
HEIGHT: 62 IN | HEART RATE: 63 BPM | SYSTOLIC BLOOD PRESSURE: 116 MMHG | DIASTOLIC BLOOD PRESSURE: 56 MMHG | OXYGEN SATURATION: 98 %

## 2022-05-16 DIAGNOSIS — R13.10 DYSPHAGIA, UNSPECIFIED: ICD-10-CM

## 2022-05-16 DIAGNOSIS — K21.9 GASTRO-ESOPHAGEAL REFLUX DISEASE WITHOUT ESOPHAGITIS: ICD-10-CM

## 2022-05-16 DIAGNOSIS — K58.0 IRRITABLE BOWEL SYNDROME WITH DIARRHEA: ICD-10-CM

## 2022-05-16 PROCEDURE — 99213 OFFICE O/P EST LOW 20 MIN: CPT | Performed by: INTERNAL MEDICINE

## 2022-05-16 RX ORDER — DIPHENOXYLATE HYDROCHLORIDE AND ATROPINE SULFATE 2.5; .025 MG/1; MG/1
TABLET ORAL
Qty: 90 | Refills: 5 | Status: COMPLETED
End: 2024-03-07

## 2022-05-16 RX ORDER — DICYCLOMINE HYDROCHLORIDE 20 MG/1
TABLET ORAL
Qty: 120 | Refills: 11 | Status: COMPLETED
Start: 2019-12-11 | End: 2023-06-29

## 2022-05-16 NOTE — SERVICEHPINOTES
Ms. Quiñones is a 65-year-old female here for follow-up of IBS and dysphagia. She continues to do well. She still does have some occasional issues with diarrhea for which she takes Bentyl and Lomotil as needed. She states she will take this on a more regular basis when she is on vacation because she has she tends to eat out more. In the past we have discussed TCA but she states that she does not want to take anything on a regular basis and continues to not want this. She denies any issues with fevers, chills, nausea, vomiting, or rectal bleeding. She denies any issues with dysphagia and states her reflux is under good control. It only occurs occasionally which she takes Tums or Pepto-Bismol which helps her symptoms.Previous GI History:Magdiel initially saw her in March 2015 where at that time she was having more epigastric abdominal pain and burning. This is mainly after meals. It was associated with some nausea and bloating. At that time she was not having any diarrhea. Blood work at that time did include a mild microcytic anemia with mild decrease of hemoglobin to 12.5 but normal hematocrit of 40.2. C-reactive protein was minimally elevated at 0.7. She underwent EGD and colonoscopy with EGD revealing normal esophagus, gastric, and duodenal mucosa was negative biopsies from the stomach and duodenum. Colonoscopy at the same time revealed normal mucosa to the level of the terminal ileum. Two small polyps removed, one of which was a tubular adenoma. Her next colonoscopy is due in five years. Iron studies performed were negative including normal ferritin and percent iron saturation. Because of some persistent symptoms she underwent CT of the abdomen and pelvis that was completely normal. Because of persistent symptoms we gave her a trial of rifaximin, which she states did not help much at all. She was also on probiotics for two months which also did not help much. We switched her from Levsin to Bentyl which she states helps more, but she still has symptoms. Because of some persistent diarrhea we gave her a trial of colestipol which she states did not help that much. Because of this she stopped it. When we saw the patient November 2017 she was having some more diarrhea and had some weight loss. We check blood work which was normal including normal complement levels, normal hematocrit, and normal inflammatory markers except for mild elevation of CRP at 11. We did have her undergo colonoscopy in 2017 which was normal to the terminal ileum. Biopsies were normal except for some variable increase in mast cells. Because of this we did give her a trial of H1 and H2 blocker which she states actually helps some but she stopped taking because she did not want to take the medicine on a regular basis. Because of the patient's weight loss and dysphagia symptoms she underwent EGD in 2018, which was normal. Empiric dilation was performed. Esophageal biopsies, stomach biopsies, and duodenal biopsies were all negative. There is some mild patchy intestinal steatosis which appeared insignificant. She did have some mild pain after the EGD but this resolved over the next couple of days. She has since undergone surgery for goiter which she states she was told was wrapped around her esophagus.

## 2022-05-16 NOTE — SERVICENOTES
We can discuss the possibility of starting TCA or other medication for her IBS with diarrhea but the patient states that she does not want to take anything on a regular basis as her symptoms are not that bad and she would rather just take medicines as needed.

## 2023-06-29 ENCOUNTER — OFFICE (OUTPATIENT)
Dept: URBAN - METROPOLITAN AREA CLINIC 14 | Facility: CLINIC | Age: 68
End: 2023-06-29

## 2023-06-29 VITALS
DIASTOLIC BLOOD PRESSURE: 84 MMHG | WEIGHT: 160 LBS | HEART RATE: 107 BPM | HEIGHT: 62 IN | SYSTOLIC BLOOD PRESSURE: 142 MMHG | OXYGEN SATURATION: 93 % | RESPIRATION RATE: 20 BRPM

## 2023-06-29 DIAGNOSIS — K21.9 GASTRO-ESOPHAGEAL REFLUX DISEASE WITHOUT ESOPHAGITIS: ICD-10-CM

## 2023-06-29 DIAGNOSIS — K58.0 IRRITABLE BOWEL SYNDROME WITH DIARRHEA: ICD-10-CM

## 2023-06-29 PROCEDURE — 99213 OFFICE O/P EST LOW 20 MIN: CPT | Performed by: INTERNAL MEDICINE

## 2023-06-29 RX ORDER — DIPHENOXYLATE HYDROCHLORIDE AND ATROPINE SULFATE 2.5; .025 MG/1; MG/1
TABLET ORAL
Qty: 90 | Refills: 5 | Status: COMPLETED
End: 2024-03-07

## 2023-06-29 RX ORDER — DICYCLOMINE HYDROCHLORIDE 20 MG/1
20 TABLET ORAL
Qty: 90 | Refills: 3 | Status: ACTIVE

## 2024-03-07 ENCOUNTER — OFFICE (OUTPATIENT)
Dept: URBAN - METROPOLITAN AREA CLINIC 11 | Facility: CLINIC | Age: 69
End: 2024-03-07
Payer: COMMERCIAL

## 2024-03-07 VITALS
SYSTOLIC BLOOD PRESSURE: 152 MMHG | WEIGHT: 162 LBS | HEIGHT: 62 IN | DIASTOLIC BLOOD PRESSURE: 81 MMHG | HEART RATE: 78 BPM | OXYGEN SATURATION: 92 %

## 2024-03-07 DIAGNOSIS — K21.9 GASTRO-ESOPHAGEAL REFLUX DISEASE WITHOUT ESOPHAGITIS: ICD-10-CM

## 2024-03-07 DIAGNOSIS — K58.0 IRRITABLE BOWEL SYNDROME WITH DIARRHEA: ICD-10-CM

## 2024-03-07 PROCEDURE — 99213 OFFICE O/P EST LOW 20 MIN: CPT | Performed by: INTERNAL MEDICINE

## 2024-03-07 RX ORDER — DIPHENOXYLATE HYDROCHLORIDE AND ATROPINE SULFATE 2.5; .025 MG/1; MG/1
7.5 TABLET ORAL
Qty: 90 | Refills: 5 | Status: ACTIVE

## 2024-03-07 RX ORDER — DICYCLOMINE HYDROCHLORIDE 20 MG/1
20 TABLET ORAL
Qty: 90 | Refills: 3 | Status: ACTIVE